# Patient Record
Sex: FEMALE | Race: BLACK OR AFRICAN AMERICAN | NOT HISPANIC OR LATINO | Employment: FULL TIME | URBAN - METROPOLITAN AREA
[De-identification: names, ages, dates, MRNs, and addresses within clinical notes are randomized per-mention and may not be internally consistent; named-entity substitution may affect disease eponyms.]

---

## 2018-08-16 PROBLEM — I10 ESSENTIAL HYPERTENSION, BENIGN: Status: ACTIVE | Noted: 2018-08-16

## 2018-08-16 PROBLEM — Z00.00 ROUTINE GENERAL MEDICAL EXAMINATION AT A HEALTH CARE FACILITY: Status: ACTIVE | Noted: 2018-08-16

## 2018-08-26 PROBLEM — R80.8 OTHER PROTEINURIA: Status: ACTIVE | Noted: 2018-08-26

## 2018-08-26 PROBLEM — D50.9 MICROCYTIC ANEMIA: Status: ACTIVE | Noted: 2018-08-26

## 2018-08-26 PROBLEM — E78.2 MIXED HYPERLIPIDEMIA: Status: ACTIVE | Noted: 2018-08-26

## 2018-09-09 PROBLEM — I11.9 LVH (LEFT VENTRICULAR HYPERTROPHY) DUE TO HYPERTENSIVE DISEASE, WITHOUT HEART FAILURE: Status: ACTIVE | Noted: 2018-09-09

## 2018-11-19 PROBLEM — Z00.00 ROUTINE GENERAL MEDICAL EXAMINATION AT A HEALTH CARE FACILITY: Status: RESOLVED | Noted: 2018-08-16 | Resolved: 2018-11-19

## 2020-06-30 ENCOUNTER — HOSPITAL ENCOUNTER (EMERGENCY)
Facility: HOSPITAL | Age: 45
Discharge: HOME | End: 2020-06-30
Attending: EMERGENCY MEDICINE

## 2020-06-30 VITALS
OXYGEN SATURATION: 99 % | HEART RATE: 110 BPM | BODY MASS INDEX: 38.41 KG/M2 | RESPIRATION RATE: 18 BRPM | SYSTOLIC BLOOD PRESSURE: 199 MMHG | DIASTOLIC BLOOD PRESSURE: 120 MMHG | WEIGHT: 210 LBS | TEMPERATURE: 99 F

## 2020-06-30 DIAGNOSIS — Z20.822 EXPOSURE TO COVID-19 VIRUS: Primary | ICD-10-CM

## 2020-06-30 PROCEDURE — 99281 EMR DPT VST MAYX REQ PHY/QHP: CPT

## 2020-06-30 NOTE — ED PROVIDER NOTES
HISTORY     Chief Complaint   Patient presents with    COVID-19 Concerns     reports SOB, runny nose. reports HTN but did not take medication today.         Review of patient's allergies indicates:  No Known Allergies     HPI   HPI     Pt reports being exposed to Covid-19. Pt reports the following symptoms: sob, runny nose, and htn,reports did not take bp meds today. Pt denies any of the following: CP,fever, NVD, abdominal pain or any other symptoms at this time.     PCP: Primary Doctor No     Past Medical History:  Past Medical History:   Diagnosis Date    Hypertension         Past Surgical History:  Past Surgical History:   Procedure Laterality Date     SECTION      TUBAL LIGATION      VAGINAL DELIVERY          Family History:  Family History   Problem Relation Age of Onset    Hypertension Mother     Hypertension Father     Hypertension Maternal Grandmother     Hypertension Maternal Grandfather     Cancer Neg Hx     Diabetes Neg Hx     Stroke Neg Hx         Social History:  Social History     Tobacco Use    Smoking status: Former Smoker    Smokeless tobacco: Never Used   Substance and Sexual Activity    Alcohol use: Yes     Comment: social    Drug use: No    Sexual activity: Yes         ROS   Review of Systems   Constitutional: Negative for fever.   HENT: Positive for rhinorrhea. Negative for sore throat.    Respiratory: Positive for shortness of breath.    Cardiovascular: Negative for chest pain.   Gastrointestinal: Negative for nausea.   Genitourinary: Negative for dysuria.   Musculoskeletal: Negative for back pain.   Skin: Negative for rash.   Neurological: Negative for weakness.   Hematological: Does not bruise/bleed easily.       PHYSICAL EXAM     Initial Vitals [20 1520]   BP Pulse Resp Temp SpO2   (!) 199/120 110 18 98.6 °F (37 °C) 99 %      MAP       --           Physical Exam     Nursing Notes and Vital Signs Reviewed.  Constitutional: Patient is in no acute distress.    Pulmonary/Chest: No respiratory distress.   Musculoskeletal: Moves all extremities.   Neurological:  Alert, awake, and appropriate.  Normal speech.    Psychiatric: Normal affect. Good eye contact. Appropriate in content.      ED COURSE   Procedures  ED ONGOING VITALS:  Vitals:    06/30/20 1520   BP: (!) 199/120   Pulse: 110   Resp: 18   Temp: 98.6 °F (37 °C)   TempSrc: Oral   SpO2: 99%   Weight: 95.3 kg (210 lb)         ABNORMAL LAB VALUES:  Labs Reviewed - No data to display      ALL LAB VALUES:  none      RADIOLOGY STUDIES:  Imaging Results    None                   The above vital signs and test results have been reviewed by the emergency provider.     ED Medications:  Current Discharge Medication List        Discharge Medications:  New Prescriptions    No medications on file      Follow-up Information     pcp of choice.    Why: As needed                4:00 PM  Due to lack of hospital resources, a list of community resources provided to pt for covid 19 testing     I discussed with patient and/or family/caretaker that evaluation in the ED does not suggest any emergent or life threatening medical conditions requiring immediate intervention beyond what was provided in the ED, and I believe patient is safe for discharge. Regardless, an unremarkable evaluation in the ED does not preclude the development or presence of a serious or life threatening condition. As such, patient was instructed to return immediately for any worsening or change in current symptoms.        MEDICAL DECISION MAKING                 CLINICAL IMPRESSION       ICD-10-CM ICD-9-CM   1. Exposure to Covid-19 Virus  Z20.828        Disposition:   Disposition: Discharged  Condition: Stable         Vaughn Latham NP  06/30/20 0996

## 2020-08-14 ENCOUNTER — HOSPITAL ENCOUNTER (EMERGENCY)
Facility: HOSPITAL | Age: 45
Discharge: HOME | End: 2020-08-14
Attending: EMERGENCY MEDICINE

## 2020-08-14 VITALS
TEMPERATURE: 99 F | DIASTOLIC BLOOD PRESSURE: 78 MMHG | SYSTOLIC BLOOD PRESSURE: 122 MMHG | WEIGHT: 220.25 LBS | HEIGHT: 63 IN | OXYGEN SATURATION: 100 % | RESPIRATION RATE: 18 BRPM | BODY MASS INDEX: 39.02 KG/M2 | HEART RATE: 115 BPM

## 2020-08-14 DIAGNOSIS — L50.9 URTICARIA: ICD-10-CM

## 2020-08-14 DIAGNOSIS — L50.9 HIVES: Primary | ICD-10-CM

## 2020-08-14 DIAGNOSIS — R00.0 TACHYCARDIA: ICD-10-CM

## 2020-08-14 DIAGNOSIS — L28.2 PRURITIC RASH: ICD-10-CM

## 2020-08-14 PROCEDURE — 93010 ELECTROCARDIOGRAM REPORT: CPT | Mod: ,,, | Performed by: INTERNAL MEDICINE

## 2020-08-14 PROCEDURE — 93010 EKG 12-LEAD: ICD-10-PCS | Mod: ,,, | Performed by: INTERNAL MEDICINE

## 2020-08-14 PROCEDURE — S0028 INJECTION, FAMOTIDINE, 20 MG: HCPCS | Performed by: EMERGENCY MEDICINE

## 2020-08-14 PROCEDURE — 99284 EMERGENCY DEPT VISIT MOD MDM: CPT | Mod: 25

## 2020-08-14 PROCEDURE — 96375 TX/PRO/DX INJ NEW DRUG ADDON: CPT

## 2020-08-14 PROCEDURE — 93005 ELECTROCARDIOGRAM TRACING: CPT

## 2020-08-14 PROCEDURE — 25000003 PHARM REV CODE 250: Performed by: EMERGENCY MEDICINE

## 2020-08-14 PROCEDURE — 96374 THER/PROPH/DIAG INJ IV PUSH: CPT

## 2020-08-14 PROCEDURE — 63600175 PHARM REV CODE 636 W HCPCS: Performed by: EMERGENCY MEDICINE

## 2020-08-14 RX ORDER — DIPHENHYDRAMINE HYDROCHLORIDE 50 MG/ML
25 INJECTION INTRAMUSCULAR; INTRAVENOUS
Status: COMPLETED | OUTPATIENT
Start: 2020-08-14 | End: 2020-08-14

## 2020-08-14 RX ORDER — FAMOTIDINE 20 MG/1
20 TABLET, FILM COATED ORAL 2 TIMES DAILY
Qty: 20 TABLET | Refills: 0 | Status: SHIPPED | OUTPATIENT
Start: 2020-08-14 | End: 2021-08-14

## 2020-08-14 RX ORDER — DIPHENHYDRAMINE HCL 25 MG
25 CAPSULE ORAL EVERY 6 HOURS PRN
Qty: 20 CAPSULE | Refills: 0 | Status: SHIPPED | OUTPATIENT
Start: 2020-08-14

## 2020-08-14 RX ORDER — FAMOTIDINE 10 MG/ML
20 INJECTION INTRAVENOUS
Status: COMPLETED | OUTPATIENT
Start: 2020-08-14 | End: 2020-08-14

## 2020-08-14 RX ORDER — METHYLPREDNISOLONE SOD SUCC 125 MG
125 VIAL (EA) INJECTION
Status: COMPLETED | OUTPATIENT
Start: 2020-08-14 | End: 2020-08-14

## 2020-08-14 RX ORDER — PREDNISONE 50 MG/1
50 TABLET ORAL DAILY
Qty: 5 TABLET | Refills: 0 | Status: SHIPPED | OUTPATIENT
Start: 2020-08-14 | End: 2020-08-19

## 2020-08-14 RX ADMIN — FAMOTIDINE 20 MG: 10 INJECTION, SOLUTION INTRAVENOUS at 05:08

## 2020-08-14 RX ADMIN — DIPHENHYDRAMINE HYDROCHLORIDE 25 MG: 50 INJECTION, SOLUTION INTRAMUSCULAR; INTRAVENOUS at 05:08

## 2020-08-14 RX ADMIN — METHYLPREDNISOLONE SODIUM SUCCINATE 125 MG: 125 INJECTION, POWDER, FOR SOLUTION INTRAMUSCULAR; INTRAVENOUS at 05:08

## 2020-08-14 NOTE — ED PROVIDER NOTES
SCRIBE #1 NOTE: I, Katlyn Apodaca, am scribing for, and in the presence of, Yoli Mayo MD. I have scribed the HPI, ROS, and PEx.     SCRIBE #2 NOTE: I, Gabrielle Guajardo, am scribing for, and in the presence of,  Reddy Lebron MD. I have scribed the remaining portions of the note not scribed by Scribe #1.      History     Chief Complaint   Patient presents with    Allergic Reaction     used a new detergent and now has  generalized hives     Review of patient's allergies indicates:  No Known Allergies      History of Present Illness     HPI    2020, 5:48 AM  History obtained from the patient      History of Present Illness: Melyssa Ortiz is a 45 y.o. female patient with a PMHx of HTN who presents to the Emergency Department for evaluation of generalized rash which onset gradually about 14 hours ago. Symptoms are constant and moderate in severity. No mitigating or exacerbating factors reported. Pt reports recently using a new detergent. Associated sxs include generalized itching. Patient denies any fever, cough, stridor, wheezing, SOB, difficulty swallowing, n/v, and all other sxs at this time. Prior Tx includes a benadryl taken at home with minimal relief. LNMP is current. No further complaints or concerns at this time.       Arrival mode: Personal vehicle     PCP: Primary Doctor No        Past Medical History:  Past Medical History:   Diagnosis Date    Hypertension        Past Surgical History:  Past Surgical History:   Procedure Laterality Date     SECTION      TUBAL LIGATION      VAGINAL DELIVERY           Family History:  Family History   Problem Relation Age of Onset    Hypertension Mother     Hypertension Father     Hypertension Maternal Grandmother     Hypertension Maternal Grandfather     Cancer Neg Hx     Diabetes Neg Hx     Stroke Neg Hx        Social History:  Social History     Tobacco Use    Smoking status: Former Smoker    Smokeless tobacco: Never Used   Substance and  Sexual Activity    Alcohol use: Yes     Comment: social    Drug use: No    Sexual activity: Yes        Review of Systems     Review of Systems   Constitutional: Negative for fever.   HENT: Negative for sore throat and trouble swallowing.    Respiratory: Negative for shortness of breath, wheezing and stridor.    Cardiovascular: Negative for chest pain.   Gastrointestinal: Negative for nausea and vomiting.   Genitourinary: Negative for dysuria.   Musculoskeletal: Negative for back pain.   Skin: Positive for rash (itching generalized welps).   Neurological: Negative for weakness.   Hematological: Does not bruise/bleed easily.   All other systems reviewed and are negative.     Physical Exam     Initial Vitals [08/14/20 0445]   BP Pulse Resp Temp SpO2   130/87 (!) 132 19 98.6 °F (37 °C) 100 %      MAP       --          Physical Exam  Nursing Notes and Vital Signs Reviewed.  Constitutional: Patient is in no acute distress. Well-developed and well-nourished.  Head: Atraumatic. Normocephalic.  Eyes: PERRL. EOM intact. Conjunctivae are not pale. No scleral icterus.  ENT: Mucous membranes are moist. Oropharynx is clear and symmetric.    Neck: Supple. Full ROM. No lymphadenopathy.  Cardiovascular: Regular rate. Regular rhythm. No murmurs, rubs, or gallops. Distal pulses are 2+ and symmetric.  Pulmonary/Chest: No respiratory distress. Clear to auscultation bilaterally. No wheezing or rales.  Abdominal: Soft and non-distended.  There is no tenderness.  No rebound, guarding, or rigidity. Good bowel sounds.  Genitourinary: No CVA tenderness  Musculoskeletal: Moves all extremities. No obvious deformities. No edema. No calf tenderness.  Skin: Warm and dry. Generalized whelps and wheels to bilateral extremities and trunk, not in face or neck.   Neurological:  Alert, awake, and appropriate.  Normal speech.  No acute focal neurological deficits are appreciated.  Psychiatric: Normal affect. Good eye contact. Appropriate in  "content.     ED Course   Procedures  ED Vital Signs:  Vitals:    08/14/20 0445   BP: 130/87   Pulse: (!) 132   Resp: 19   Temp: 98.6 °F (37 °C)   TempSrc: Oral   SpO2: 100%   Weight: 99.9 kg (220 lb 3.8 oz)   Height: 5' 3" (1.6 m)       Abnormal Lab Results:  Labs Reviewed - No data to display         The EKG was ordered, reviewed, and independently interpreted by the ED provider.  Interpretation time: 0523  Rate: 140 BPM  Rhythm: sinus tachycardia  Interpretation: T wave abnormality, consider inferolateral ischemia. No STEMI.           The Emergency Provider reviewed the vital signs and test results, which are outlined above.     ED Discussion     6:03 AM: Dr. Mayo transfers care of patient to Dr. Lebron for monitoring of allergic reaction.    6:45 AM: Reassessed pt at this time.  Pt states her condition has improved at this time. Discussed with pt all pertinent ED information and results. Discussed pt dx and plan of tx. Gave pt all f/u and return to the ED instructions. All questions and concerns were addressed at this time. Pt expresses understanding of information and instructions, and is comfortable with plan to discharge. Pt is stable for discharge.    I discussed with patient and/or family/caretaker that evaluation in the ED does not suggest any emergent or life threatening medical conditions requiring immediate intervention beyond what was provided in the ED, and I believe patient is safe for discharge.  Regardless, an unremarkable evaluation in the ED does not preclude the development or presence of a serious of life threatening condition. As such, patient was instructed to return immediately for any worsening or change in current symptoms.         Medical Decision Making:   Clinical Tests:   Medical Tests: Ordered and Reviewed           ED Medication(s):  Medications   famotidine (PF) injection 20 mg (20 mg Intravenous Given 8/14/20 0533)   methylPREDNISolone sodium succinate injection 125 mg (125 mg " Intravenous Given 8/14/20 0533)   diphenhydrAMINE injection 25 mg (25 mg Intravenous Given 8/14/20 0533)       New Prescriptions    DIPHENHYDRAMINE (BENADRYL) 25 MG CAPSULE    Take 1 capsule (25 mg total) by mouth every 6 (six) hours as needed for Itching.    FAMOTIDINE (PEPCID) 20 MG TABLET    Take 1 tablet (20 mg total) by mouth 2 (two) times daily.    PREDNISONE (DELTASONE) 50 MG TAB    Take 1 tablet (50 mg total) by mouth once daily. for 5 days       Follow-up Information     [facility] In 2 days.    Contact information:  [address] [facility] [address] [phone]                       Scribe Attestation:   Scribe #1: I performed the above scribed service and the documentation accurately describes the services I performed. I attest to the accuracy of the note.     Attending:   Physician Attestation Statement for Scribe #1: I, Yoli Mayo MD, personally performed the services described in this documentation, as scribed by Katlyn Apodaca, in my presence, and it is both accurate and complete.       Scribe Attestation:   Scribe #2: I performed the above scribed service and the documentation accurately describes the services I performed. I attest to the accuracy of the note.    Attending Attestation:           Physician Attestation for Scribe:    Physician Attestation Statement for Scribe #2: I, Reddy Lebron MD, reviewed documentation, as scribed by Gabrielle Guajardo in my presence, and it is both accurate and complete. I also acknowledge and confirm the content of the note done by Scribe #1.           Clinical Impression       ICD-10-CM ICD-9-CM   1. Hives  L50.9 708.9   2. Tachycardia  R00.0 785.0   3. Urticaria  L50.9 708.9   4. Pruritic rash  L28.2 698.2       Disposition:   Disposition: Discharged  Condition: Stable         Reddy Lebron MD  08/14/20 0653